# Patient Record
Sex: MALE | Race: WHITE | NOT HISPANIC OR LATINO | Employment: FULL TIME | URBAN - NONMETROPOLITAN AREA
[De-identification: names, ages, dates, MRNs, and addresses within clinical notes are randomized per-mention and may not be internally consistent; named-entity substitution may affect disease eponyms.]

---

## 2018-07-05 ENCOUNTER — HOSPITAL ENCOUNTER (EMERGENCY)
Facility: HOSPITAL | Age: 38
Discharge: HOME | End: 2018-07-05
Attending: EMERGENCY MEDICINE | Admitting: NURSE PRACTITIONER

## 2018-07-05 VITALS
WEIGHT: 185 LBS | TEMPERATURE: 98 F | BODY MASS INDEX: 27.4 KG/M2 | HEIGHT: 69 IN | SYSTOLIC BLOOD PRESSURE: 130 MMHG | HEART RATE: 77 BPM | RESPIRATION RATE: 18 BRPM | DIASTOLIC BLOOD PRESSURE: 83 MMHG | OXYGEN SATURATION: 98 %

## 2018-07-05 DIAGNOSIS — L24.9 IRRITANT CONTACT DERMATITIS, UNSPECIFIED TRIGGER: Primary | ICD-10-CM

## 2018-07-05 PROCEDURE — 25010000002 METHYLPREDNISOLONE PER 125 MG: Performed by: NURSE PRACTITIONER

## 2018-07-05 PROCEDURE — 99283 EMERGENCY DEPT VISIT LOW MDM: CPT

## 2018-07-05 PROCEDURE — 96375 TX/PRO/DX INJ NEW DRUG ADDON: CPT

## 2018-07-05 PROCEDURE — 96374 THER/PROPH/DIAG INJ IV PUSH: CPT

## 2018-07-05 PROCEDURE — 25010000002 DIPHENHYDRAMINE PER 50 MG: Performed by: NURSE PRACTITIONER

## 2018-07-05 RX ORDER — PREDNISONE 50 MG/1
50 TABLET ORAL DAILY
Qty: 5 TABLET | Refills: 0 | Status: SHIPPED | OUTPATIENT
Start: 2018-07-05

## 2018-07-05 RX ORDER — BUPRENORPHINE HYDROCHLORIDE AND NALOXONE HYDROCHLORIDE DIHYDRATE 8; 2 MG/1; MG/1
0.5 TABLET SUBLINGUAL DAILY
COMMUNITY

## 2018-07-05 RX ORDER — DIPHENHYDRAMINE HYDROCHLORIDE 50 MG/ML
25 INJECTION INTRAMUSCULAR; INTRAVENOUS ONCE
Status: COMPLETED | OUTPATIENT
Start: 2018-07-05 | End: 2018-07-05

## 2018-07-05 RX ORDER — METHYLPREDNISOLONE SODIUM SUCCINATE 125 MG/2ML
125 INJECTION, POWDER, LYOPHILIZED, FOR SOLUTION INTRAMUSCULAR; INTRAVENOUS ONCE
Status: COMPLETED | OUTPATIENT
Start: 2018-07-05 | End: 2018-07-05

## 2018-07-05 RX ORDER — FAMOTIDINE 10 MG/ML
20 INJECTION, SOLUTION INTRAVENOUS ONCE
Status: COMPLETED | OUTPATIENT
Start: 2018-07-05 | End: 2018-07-05

## 2018-07-05 RX ORDER — SODIUM CHLORIDE 0.9 % (FLUSH) 0.9 %
10 SYRINGE (ML) INJECTION AS NEEDED
Status: DISCONTINUED | OUTPATIENT
Start: 2018-07-05 | End: 2018-07-05 | Stop reason: HOSPADM

## 2018-07-05 RX ADMIN — DIPHENHYDRAMINE HYDROCHLORIDE 25 MG: 50 INJECTION INTRAMUSCULAR; INTRAVENOUS at 16:26

## 2018-07-05 RX ADMIN — FAMOTIDINE 20 MG: 10 INJECTION, SOLUTION INTRAVENOUS at 16:27

## 2018-07-05 RX ADMIN — METHYLPREDNISOLONE SODIUM SUCCINATE 125 MG: 125 INJECTION, POWDER, FOR SOLUTION INTRAMUSCULAR; INTRAVENOUS at 16:25

## 2018-07-05 NOTE — ED NOTES
Patient tolerated injections well without any reaction.  No changes noted in rash and swelling.     Lashon Correia RN  07/05/18 6972

## 2018-07-06 NOTE — ED PROVIDER NOTES
Subjective     History provided by:  Patient  Rash   Location:  Shoulder/arm and pelvis  Shoulder/arm rash location:  L forearm and R forearm  Pelvic rash location:  Groin  Quality: blistering, itchiness, painful, redness and swelling    Pain details:     Quality:  Throbbing, sore and itching    Severity:  Moderate    Onset quality:  Gradual    Duration:  2 days    Timing:  Constant    Progression:  Worsening  Severity:  Severe  Onset quality:  Gradual  Timing:  Constant  Progression:  Worsening  Chronicity:  New  Relieved by:  Nothing  Worsened by:  Nothing  Ineffective treatments:  Moisturizers and antihistamines  Associated symptoms: no abdominal pain, no fever, no throat swelling, no tongue swelling, no URI and not vomiting        Review of Systems   Constitutional: Negative.  Negative for fever.   HENT: Negative.    Respiratory: Negative.    Cardiovascular: Negative.  Negative for chest pain.   Gastrointestinal: Negative.  Negative for abdominal pain and vomiting.   Endocrine: Negative.    Genitourinary: Negative.  Negative for dysuria.   Skin: Positive for rash.   Neurological: Negative.    Psychiatric/Behavioral: Negative.    All other systems reviewed and are negative.      History reviewed. No pertinent past medical history.    No Known Allergies    History reviewed. No pertinent surgical history.    History reviewed. No pertinent family history.    Social History     Social History   • Marital status:      Social History Main Topics   • Smoking status: Current Every Day Smoker     Packs/day: 1.00     Types: Cigarettes   • Smokeless tobacco: Never Used   • Alcohol use No   • Drug use: No   • Sexual activity: Defer     Other Topics Concern   • Not on file           Objective   Physical Exam   Constitutional: He is oriented to person, place, and time. He appears well-developed and well-nourished. No distress.   HENT:   Head: Normocephalic and atraumatic.   Right Ear: External ear normal.   Left Ear:  External ear normal.   Nose: Nose normal.   Eyes: Conjunctivae and EOM are normal. Pupils are equal, round, and reactive to light.   Neck: Normal range of motion. Neck supple. No JVD present. No tracheal deviation present.   Cardiovascular: Normal rate, regular rhythm and normal heart sounds.    No murmur heard.  Pulmonary/Chest: Effort normal and breath sounds normal. No respiratory distress. He has no wheezes.   Abdominal: Soft. Bowel sounds are normal. There is no tenderness.   Musculoskeletal: Normal range of motion. He exhibits no edema or deformity.   Neurological: He is alert and oriented to person, place, and time. No cranial nerve deficit.   Skin: Skin is warm and dry. Rash noted. Rash is macular, vesicular and urticarial. He is not diaphoretic. There is erythema. No pallor.   Psychiatric: He has a normal mood and affect. His behavior is normal. Thought content normal.   Nursing note and vitals reviewed.      Procedures           ED Course                  MDM  Number of Diagnoses or Management Options  Irritant contact dermatitis, unspecified trigger: new and does not require workup  Risk of Complications, Morbidity, and/or Mortality  Presenting problems: low  Diagnostic procedures: low  Management options: low    Patient Progress  Patient progress: stable        Final diagnoses:   Irritant contact dermatitis, unspecified trigger            Becca Wood, ALISHA  07/05/18 5775

## 2023-08-09 ENCOUNTER — TRANSCRIBE ORDERS (OUTPATIENT)
Dept: ADMINISTRATIVE | Facility: HOSPITAL | Age: 43
End: 2023-08-09
Payer: COMMERCIAL

## 2023-08-09 DIAGNOSIS — R19.00 ABDOMINAL PULSATILE MASS: Primary | ICD-10-CM

## 2023-08-29 ENCOUNTER — HOSPITAL ENCOUNTER (OUTPATIENT)
Dept: CT IMAGING | Facility: HOSPITAL | Age: 43
Discharge: HOME | End: 2023-08-29
Admitting: FAMILY MEDICINE
Payer: COMMERCIAL

## 2023-08-29 DIAGNOSIS — R19.00 ABDOMINAL PULSATILE MASS: ICD-10-CM

## 2023-08-29 PROCEDURE — 25510000001 IOPAMIDOL 61 % SOLUTION: Performed by: FAMILY MEDICINE

## 2023-08-29 PROCEDURE — 74177 CT ABD & PELVIS W/CONTRAST: CPT

## 2023-08-29 RX ADMIN — IOPAMIDOL 100 ML: 612 INJECTION, SOLUTION INTRAVENOUS at 14:17

## 2023-09-26 ENCOUNTER — OFFICE VISIT (OUTPATIENT)
Dept: SURGERY | Facility: CLINIC | Age: 43
End: 2023-09-26
Payer: COMMERCIAL

## 2023-09-26 VITALS — HEIGHT: 69 IN | BODY MASS INDEX: 29.47 KG/M2 | WEIGHT: 199 LBS

## 2023-09-26 DIAGNOSIS — R19.05 PERIUMBILICAL MASS: Primary | ICD-10-CM

## 2023-09-26 DIAGNOSIS — K59.09 OTHER CONSTIPATION: ICD-10-CM

## 2023-09-26 PROBLEM — R19.00 ABDOMINAL MASS: Status: ACTIVE | Noted: 2023-09-26

## 2023-09-26 PROCEDURE — 99204 OFFICE O/P NEW MOD 45 MIN: CPT | Performed by: SURGERY

## 2023-09-26 RX ORDER — TRAZODONE HYDROCHLORIDE 100 MG/1
50 TABLET ORAL NIGHTLY
COMMUNITY
Start: 2023-08-09

## 2023-09-26 RX ORDER — PRAVASTATIN SODIUM 20 MG
20 TABLET ORAL DAILY
COMMUNITY
Start: 2023-09-12 | End: 2024-09-11

## 2023-09-26 RX ORDER — POLYETHYLENE GLYCOL 3350 17 G/17G
17 POWDER, FOR SOLUTION ORAL 2 TIMES DAILY
Qty: 765 G | Refills: 0 | Status: SHIPPED | OUTPATIENT
Start: 2023-09-26

## 2023-09-26 RX ORDER — BISACODYL 5 MG/1
5 TABLET, DELAYED RELEASE ORAL DAILY
Qty: 30 TABLET | Refills: 0 | Status: SHIPPED | OUTPATIENT
Start: 2023-09-26 | End: 2023-10-26
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
## 2023-09-27 ENCOUNTER — TELEPHONE (OUTPATIENT)
Dept: SURGERY | Facility: CLINIC | Age: 43
End: 2023-09-27

## 2023-09-27 NOTE — TELEPHONE ENCOUNTER
Caller: STEPHEN MAY    Relationship: [employer]    Best call back number: [phone]     What form or medical record are you requesting: PT IS NEEDING A WORK EXCUSE FOR YESTERDAY'S APPT, 9/26/23, FOR HAVING TO LEAVE WORK FROM 2:00 - 5:00 PM.    Who is requesting this form or medical record from you: [employer]    How would you like to receive the form or medical records (pick-up, mail, fax): PICK-UP   If pick-up, provide patient with address and location details    Timeframe paperwork needed: ASAP - NEEDED BY TOMORROW    Additional notes: WILL TRY TO SEE IF THERE IS A FAX # THAT IT CAN BE SENT TO AND CALL BACK, OTHERWISE PLEASE CALL WHEN READY.

## 2023-09-27 NOTE — TELEPHONE ENCOUNTER
DELETE AFTER REVIEWING: Telephone encounter to be sent to the clinical pool.    Caller: TeeteeSTEPHEN     Relationship: WIFE     Best call back number: [phone]     What form or medical record are you requesting:  WORK RELEASE    EMPLOYER REQUESTED, [employer]     How would you like to receive the form or medical records (pick-up, mail, fax): FAX   If fax, what is the fax number: [phone]  I    Timeframe paperwork needed: ASAP     Additional notes: PATIENT PREFERS WORK RELEASE TO BE FAXED INSTEAD OF PICKING IT UP         “If this is a medical records request, please allow up to 30 days from the time the signed release form is received to process this request.”

## 2023-12-19 ENCOUNTER — OFFICE VISIT (OUTPATIENT)
Age: 43
End: 2023-12-19
Payer: COMMERCIAL

## 2023-12-19 VITALS — HEIGHT: 69 IN | WEIGHT: 199 LBS | BODY MASS INDEX: 29.47 KG/M2

## 2023-12-19 DIAGNOSIS — K59.09 CHRONIC CONSTIPATION: Primary | ICD-10-CM

## 2023-12-19 PROCEDURE — 99213 OFFICE O/P EST LOW 20 MIN: CPT | Performed by: SURGERY

## 2023-12-19 RX ORDER — BISACODYL 5 MG/1
5 TABLET, DELAYED RELEASE ORAL TAKE AS DIRECTED
Qty: 15 TABLET | Refills: 0 | Status: SHIPPED | OUTPATIENT
Start: 2023-12-19

## 2023-12-19 NOTE — PROGRESS NOTES
"Patient Name:  Germán Kingsley  YOB: 1980  [id number]    Follow Up Note    Date of visit: 12/19/2023    Subjective   Subjective: Presents for follow up. Having bowel movement every 3-5 days. Uses dulcolax to help stimulate bowel movement.          Objective     Objective:     Ht 175.3 cm (69.02\")   Wt 90.3 kg (199 lb)   BMI 29.37 kg/m²       CV:  Regular rate and rhythm  L:  Bilateral lung rise and fall, no use of accessory muscles   Abd:  Soft, nontender, nondistended  Ext:  No cyanosis, clubbing, edema      Assessment/ Plan:  Assessment   Diagnoses and all orders for this visit:    1. Chronic constipation (Primary)  -     Ambulatory Referral to Gastroenterology    Other orders  -     bisacodyl (Dulcolax) 5 MG EC tablet; Take 1 tablet by mouth Take As Directed.  Dispense: 15 tablet; Refill: 0    Constipation better managed with bowel regimen. Would benefit from GI evaluation due to generalized food intolerances and constipation. Will plan for PRN follow up.     Ania Posada MD       General Surgeon  [facility]       "